# Patient Record
Sex: MALE | URBAN - METROPOLITAN AREA
[De-identification: names, ages, dates, MRNs, and addresses within clinical notes are randomized per-mention and may not be internally consistent; named-entity substitution may affect disease eponyms.]

---

## 2019-09-15 ENCOUNTER — EMERGENCY (EMERGENCY)
Facility: HOSPITAL | Age: 44
LOS: 1 days | Discharge: HOME | End: 2019-09-15
Attending: STUDENT IN AN ORGANIZED HEALTH CARE EDUCATION/TRAINING PROGRAM
Payer: COMMERCIAL

## 2019-09-15 VITALS
TEMPERATURE: 98 F | DIASTOLIC BLOOD PRESSURE: 84 MMHG | RESPIRATION RATE: 18 BRPM | HEART RATE: 93 BPM | SYSTOLIC BLOOD PRESSURE: 121 MMHG | HEIGHT: 71 IN | WEIGHT: 190.04 LBS | OXYGEN SATURATION: 99 %

## 2019-09-15 PROCEDURE — 99053 MED SERV 10PM-8AM 24 HR FAC: CPT

## 2019-09-15 PROCEDURE — 99284 EMERGENCY DEPT VISIT MOD MDM: CPT

## 2019-09-16 VITALS
OXYGEN SATURATION: 98 % | DIASTOLIC BLOOD PRESSURE: 86 MMHG | RESPIRATION RATE: 18 BRPM | SYSTOLIC BLOOD PRESSURE: 132 MMHG | TEMPERATURE: 99 F | HEART RATE: 70 BPM

## 2019-09-16 PROCEDURE — 93971 EXTREMITY STUDY: CPT | Mod: 26

## 2019-09-16 PROCEDURE — 93971 EXTREMITY STUDY: CPT

## 2019-09-16 PROCEDURE — 99284 EMERGENCY DEPT VISIT MOD MDM: CPT | Mod: 25

## 2019-09-16 NOTE — ED PROVIDER NOTE - CLINICAL SUMMARY MEDICAL DECISION MAKING FREE TEXT BOX
concern for dvt based on frequent travel risks, w/ R calf pain started 1 day ago, differential dvt vs muscle strain, no inciting injury. will obtain ultrasound and dc if negative pt is ambulatory in the department w/out any distress. Counselled on dvt signs and symptoms verbalized understanding.

## 2019-09-16 NOTE — ED ADULT NURSE NOTE - OBJECTIVE STATEMENT
45 yo male presents to ED from home c/o R calf pain after taking 2 - 10 hr flights this past week. Patient reports cramping started in R calf yesterday and is "concerned about a DVT". Patient denies SOB, CP, nvd, fever/chills, sick contacts, falls/loc. Patients calves equal in size and temperature Patient A&ox3, breathing spontaneously, airway patent, bl clear lungs, abdomen nontender, +pulses, cap refill <2 seconds. Patient resting in bed, side rails up, plan of care explained.

## 2019-09-16 NOTE — ED PROVIDER NOTE - PATIENT PORTAL LINK FT
You can access the FollowMyHealth Patient Portal offered by [facility] by registering at the following website: [email or web address]. By joining [employer]’s FollowMyHealth portal, you will also be able to view your health information using other applications (apps) compatible with our system.

## 2019-09-16 NOTE — ED ADULT NURSE NOTE - NSIMPLEMENTINTERV_GEN_ALL_ED
Implemented All Universal Safety Interventions:  [place] to call system. Call bell, personal items and telephone within reach. Instruct patient to call for assistance. Room bathroom lighting operational. Non-slip footwear when patient is off stretcher. Physically safe environment: no spills, clutter or unnecessary equipment. Stretcher in lowest position, wheels locked, appropriate side rails in place.

## 2019-09-16 NOTE — ED PROVIDER NOTE - PHYSICAL EXAMINATION
I have reviewed the triage vital signs.    Const: Well-nourished, Well-developed, appearing stated age  ENMT: Atraumatic external nose and ears, Moist mucus membranes  Neck: Symmetric, trachea midline,  CVS: +S1/S2, radial pulse 2+ bilaterally  RESP: Unlabored respiratory effort, Clear to auscultation bilaterally  GI: Nontender/Nondistended soft abdomen  MSK: Extremities w/o deformity; tenderness when palpating the R posterior calf, no palpable cords in the bilateral lower extremities 2+ DP pulses bilaterally, no erythema of the lower extremities, full ROM of the bilateral lower extremities, no swelling of the legs  Psych: Awake, Alert, & Orientedx3;  Appropriate mood and affect, cooperative

## 2019-09-16 NOTE — ED PROVIDER NOTE - NSFOLLOWUPINSTRUCTIONS_ED_ALL_ED_FT
Please follow up with your primary care doctor. We did an ultrasound and you have no blood clot. Please return to the emergency room if you develop worsening swelling in the Right lower extremity.   You were seen in the emergency department today and we recommend that you return if you develop chest pain, abdominal pain, shortness of breath, lightheadedness, vomiting, leg swelling, fever, headache, slurred speech, weakness or blurry vision.     Please follow up with your primary care doctor over the next 2-3 days for further management of your symptoms and to review your bloodwork to ensure no additional tests, imaging or bloodwork, need to be performed.

## 2019-09-16 NOTE — ED PROVIDER NOTE - OBJECTIVE STATEMENT
44 M reports 1 day of R calf cramping, Reports that he flew from [place] to [place] and then flew from [place] to [place] in the past 2 weeks. No fevers, no chills, no chest pain. No shortness of breath. 44 M reports 1 day of R calf cramping, Reports that he flew from [place] to [place] and then flew from [place] to [place] in the past 2 weeks. No fevers, no chills, no chest pain. No shortness of breath. eating normally, no vomiting no diarrhea. no lightheadedness, no syncope
